# Patient Record
Sex: MALE | Race: WHITE | URBAN - METROPOLITAN AREA
[De-identification: names, ages, dates, MRNs, and addresses within clinical notes are randomized per-mention and may not be internally consistent; named-entity substitution may affect disease eponyms.]

---

## 2020-08-27 ENCOUNTER — WEB ENCOUNTER (OUTPATIENT)
Dept: URBAN - METROPOLITAN AREA CLINIC 35 | Facility: CLINIC | Age: 50
End: 2020-08-27

## 2020-09-04 ENCOUNTER — OFFICE VISIT (OUTPATIENT)
Dept: URBAN - METROPOLITAN AREA CLINIC 33 | Facility: CLINIC | Age: 50
End: 2020-09-04

## 2020-09-04 VITALS
SYSTOLIC BLOOD PRESSURE: 125 MMHG | HEART RATE: 60 BPM | DIASTOLIC BLOOD PRESSURE: 82 MMHG | OXYGEN SATURATION: 95 % | WEIGHT: 198 LBS

## 2020-09-04 RX ORDER — SERTRALINE HYDROCHLORIDE 100 MG/1
1 TABLET TABLET, FILM COATED ORAL ONCE A DAY
Qty: 30 | Status: ACTIVE | COMMUNITY

## 2020-09-04 RX ORDER — MULTIVITAMIN
1 TABLET TABLET ORAL ONCE A DAY
Qty: 30 | Status: ACTIVE | COMMUNITY

## 2020-09-04 RX ORDER — THIAMINE HCL 100 MG
1 TABLET WITH A MEAL TABLET ORAL ONCE A DAY
Qty: 30 | Status: ACTIVE | COMMUNITY

## 2020-09-04 RX ORDER — SODIUM, POTASSIUM,MAG SULFATES 17.5-3.13G
ML AS DIRECTED SOLUTION, RECONSTITUTED, ORAL ORAL
Qty: 1 KIT | Refills: 0 | OUTPATIENT
Start: 2020-09-04

## 2020-09-04 NOTE — HPI-MIGRATED HPI
;     Colorectal Cancer Screening : 49 y/o male patient presents today for a consultation for a colorectal cancer screening.  Patient admits/denies this is his first colonoscopy due to age. He denies a family hx of colon, gastric, or esophageal cancer/polyps.   Patient currently admits 1-2 formed bowel movements per day. Patient denies any melena, mucus, or blood in stools. Patient denies heartburn. ;

## 2020-10-14 ENCOUNTER — WEB ENCOUNTER (OUTPATIENT)
Dept: URBAN - METROPOLITAN AREA CLINIC 35 | Facility: CLINIC | Age: 50
End: 2020-10-14

## 2020-10-15 ENCOUNTER — TELEPHONE ENCOUNTER (OUTPATIENT)
Dept: URBAN - METROPOLITAN AREA CLINIC 35 | Facility: CLINIC | Age: 50
End: 2020-10-15

## 2020-12-02 ENCOUNTER — OFFICE VISIT (OUTPATIENT)
Dept: URBAN - METROPOLITAN AREA CLINIC 35 | Facility: CLINIC | Age: 50
End: 2020-12-02

## 2020-12-07 ENCOUNTER — OFFICE VISIT (OUTPATIENT)
Dept: URBAN - METROPOLITAN AREA SURGERY CENTER 8 | Facility: SURGERY CENTER | Age: 50
End: 2020-12-07

## 2020-12-21 ENCOUNTER — OFFICE VISIT (OUTPATIENT)
Dept: URBAN - METROPOLITAN AREA CLINIC 33 | Facility: CLINIC | Age: 50
End: 2020-12-21

## 2022-11-10 ENCOUNTER — WEB ENCOUNTER (OUTPATIENT)
Dept: URBAN - METROPOLITAN AREA CLINIC 23 | Facility: CLINIC | Age: 52
End: 2022-11-10

## 2022-11-10 ENCOUNTER — OFFICE VISIT (OUTPATIENT)
Dept: URBAN - METROPOLITAN AREA CLINIC 23 | Facility: CLINIC | Age: 52
End: 2022-11-10
Payer: COMMERCIAL

## 2022-11-10 ENCOUNTER — LAB OUTSIDE AN ENCOUNTER (OUTPATIENT)
Dept: URBAN - METROPOLITAN AREA CLINIC 23 | Facility: CLINIC | Age: 52
End: 2022-11-10

## 2022-11-10 ENCOUNTER — DASHBOARD ENCOUNTERS (OUTPATIENT)
Age: 52
End: 2022-11-10

## 2022-11-10 VITALS
HEIGHT: 73 IN | SYSTOLIC BLOOD PRESSURE: 134 MMHG | WEIGHT: 202.8 LBS | BODY MASS INDEX: 26.88 KG/M2 | DIASTOLIC BLOOD PRESSURE: 89 MMHG | TEMPERATURE: 97.5 F | HEART RATE: 76 BPM

## 2022-11-10 DIAGNOSIS — Z12.11 COLON CANCER SCREENING: ICD-10-CM

## 2022-11-10 PROCEDURE — 99213 OFFICE O/P EST LOW 20 MIN: CPT | Performed by: STUDENT IN AN ORGANIZED HEALTH CARE EDUCATION/TRAINING PROGRAM

## 2022-11-10 RX ORDER — THIAMINE HCL 100 MG
1 TABLET WITH A MEAL TABLET ORAL ONCE A DAY
Qty: 30 | Status: ACTIVE | COMMUNITY

## 2022-11-10 RX ORDER — SODIUM, POTASSIUM,MAG SULFATES 17.5-3.13G
ML AS DIRECTED SOLUTION, RECONSTITUTED, ORAL ORAL
Qty: 1 KIT | Refills: 0 | Status: ACTIVE | COMMUNITY
Start: 2020-09-04

## 2022-11-10 RX ORDER — MULTIVITAMIN
1 TABLET TABLET ORAL ONCE A DAY
Qty: 30 | Status: ACTIVE | COMMUNITY

## 2022-11-10 RX ORDER — SERTRALINE HYDROCHLORIDE 100 MG/1
1 TABLET TABLET, FILM COATED ORAL ONCE A DAY
Qty: 30 | Status: ACTIVE | COMMUNITY

## 2022-11-10 NOTE — HPI-TODAY'S VISIT:
This patient was referred by Dr. Marek Cabello for an evaluation of colonoscopy.  A copy of this will be sent to the referring provider. 51 yo M here prior to colonoscopy. First colonoscopy. Asymptomatic.  Not on blood thinners.  No Fh of CRC. No prior surgeries.

## 2022-12-30 ENCOUNTER — OFFICE VISIT (OUTPATIENT)
Dept: URBAN - METROPOLITAN AREA SURGERY CENTER 15 | Facility: SURGERY CENTER | Age: 52
End: 2022-12-30

## 2023-02-22 ENCOUNTER — OFFICE VISIT (OUTPATIENT)
Dept: URBAN - METROPOLITAN AREA SURGERY CENTER 15 | Facility: SURGERY CENTER | Age: 53
End: 2023-02-22
Payer: COMMERCIAL

## 2023-02-22 DIAGNOSIS — Z12.11 COLON CANCER SCREENING: ICD-10-CM

## 2023-02-22 DIAGNOSIS — D12.3 ADENOMA OF TRANSVERSE COLON: ICD-10-CM

## 2023-02-22 PROCEDURE — 45385 COLONOSCOPY W/LESION REMOVAL: CPT | Performed by: STUDENT IN AN ORGANIZED HEALTH CARE EDUCATION/TRAINING PROGRAM

## 2023-02-22 PROCEDURE — G8907 PT DOC NO EVENTS ON DISCHARG: HCPCS | Performed by: STUDENT IN AN ORGANIZED HEALTH CARE EDUCATION/TRAINING PROGRAM

## 2023-02-22 RX ORDER — THIAMINE HCL 100 MG
1 TABLET WITH A MEAL TABLET ORAL ONCE A DAY
Qty: 30 | Status: ACTIVE | COMMUNITY

## 2023-02-22 RX ORDER — MULTIVITAMIN
1 TABLET TABLET ORAL ONCE A DAY
Qty: 30 | Status: ACTIVE | COMMUNITY

## 2023-02-22 RX ORDER — SODIUM, POTASSIUM,MAG SULFATES 17.5-3.13G
ML AS DIRECTED SOLUTION, RECONSTITUTED, ORAL ORAL
Qty: 1 KIT | Refills: 0 | Status: ACTIVE | COMMUNITY
Start: 2020-09-04

## 2023-02-22 RX ORDER — SERTRALINE HYDROCHLORIDE 100 MG/1
1 TABLET TABLET, FILM COATED ORAL ONCE A DAY
Qty: 30 | Status: ACTIVE | COMMUNITY